# Patient Record
Sex: MALE | Race: BLACK OR AFRICAN AMERICAN | NOT HISPANIC OR LATINO | URBAN - METROPOLITAN AREA
[De-identification: names, ages, dates, MRNs, and addresses within clinical notes are randomized per-mention and may not be internally consistent; named-entity substitution may affect disease eponyms.]

---

## 2023-10-06 ENCOUNTER — TELEPHONE (OUTPATIENT)
Dept: PRIMARY CARE | Facility: CLINIC | Age: 28
End: 2023-10-06
Payer: COMMERCIAL

## 2023-10-06 NOTE — TELEPHONE ENCOUNTER
[facility] call ed requesting a referral for Dermatology to be sent and back dated for dates of service 3/28/2023, 5/02/2023, 05/18/2023, 05/17/2023. States fax has already been sent for this request. Please fax to [phone].

## 2023-10-16 ENCOUNTER — TELEPHONE (OUTPATIENT)
Dept: PRIMARY CARE | Facility: CLINIC | Age: 28
End: 2023-10-16
Payer: COMMERCIAL

## 2023-10-16 NOTE — TELEPHONE ENCOUNTER
Requesting referral for Dermatology dates of service 3/28/2023, 05/02/2023, 05/18/2023, 05/17/2023. Please send to [phone]

## 2023-10-23 ENCOUNTER — TELEPHONE (OUTPATIENT)
Dept: PRIMARY CARE | Facility: CLINIC | Age: 28
End: 2023-10-23
Payer: COMMERCIAL

## 2023-10-23 DIAGNOSIS — Z76.0 MEDICATION REFILL: ICD-10-CM

## 2023-10-23 RX ORDER — ATENOLOL 25 MG/1
25 TABLET ORAL DAILY
Qty: 90 TABLET | Refills: 1 | Status: SHIPPED | OUTPATIENT
Start: 2023-10-23 | End: 2024-04-29

## 2023-10-23 RX ORDER — ATENOLOL 25 MG/1
25 TABLET ORAL DAILY
COMMUNITY
End: 2023-10-23 | Stop reason: SDUPTHER

## 2023-10-23 NOTE — TELEPHONE ENCOUNTER
PT called in requesting refill on RX atenolol 25 MG. States that pharmacy told him it was rejected by prescriber. Please send to [facility] in [facility].

## 2023-10-25 ENCOUNTER — HOSPITAL ENCOUNTER (EMERGENCY)
Facility: HOSPITAL | Age: 28
Discharge: HOME | End: 2023-10-26
Attending: EMERGENCY MEDICINE
Payer: COMMERCIAL

## 2023-10-25 ENCOUNTER — APPOINTMENT (OUTPATIENT)
Dept: RADIOLOGY | Facility: HOSPITAL | Age: 28
End: 2023-10-25
Payer: COMMERCIAL

## 2023-10-25 VITALS
BODY MASS INDEX: 33.88 KG/M2 | TEMPERATURE: 98.6 F | RESPIRATION RATE: 13 BRPM | HEART RATE: 52 BPM | WEIGHT: 242 LBS | SYSTOLIC BLOOD PRESSURE: 142 MMHG | HEIGHT: 71 IN | OXYGEN SATURATION: 97 % | DIASTOLIC BLOOD PRESSURE: 101 MMHG

## 2023-10-25 DIAGNOSIS — R07.9 CHEST PAIN, UNSPECIFIED TYPE: Primary | ICD-10-CM

## 2023-10-25 LAB
ALBUMIN SERPL BCP-MCNC: 4.6 G/DL (ref 3.4–5)
ALP SERPL-CCNC: 53 U/L (ref 33–120)
ALT SERPL W P-5'-P-CCNC: 18 U/L (ref 10–52)
ANION GAP SERPL CALC-SCNC: 12 MMOL/L (ref 10–20)
AST SERPL W P-5'-P-CCNC: 16 U/L (ref 9–39)
BASOPHILS # BLD AUTO: 0.01 X10*3/UL (ref 0–0.1)
BASOPHILS NFR BLD AUTO: 0.1 %
BILIRUB SERPL-MCNC: 0.8 MG/DL (ref 0–1.2)
BUN SERPL-MCNC: 10 MG/DL (ref 6–23)
CALCIUM SERPL-MCNC: 9.5 MG/DL (ref 8.6–10.3)
CARDIAC TROPONIN I PNL SERPL HS: 5 NG/L (ref 0–20)
CHLORIDE SERPL-SCNC: 100 MMOL/L (ref 98–107)
CO2 SERPL-SCNC: 31 MMOL/L (ref 21–32)
CREAT SERPL-MCNC: 1 MG/DL (ref 0.5–1.3)
D DIMER PPP FEU-MCNC: ABNORMAL NG/ML FEU
EOSINOPHIL # BLD AUTO: 0.12 X10*3/UL (ref 0–0.7)
EOSINOPHIL NFR BLD AUTO: 1.5 %
ERYTHROCYTE [DISTWIDTH] IN BLOOD BY AUTOMATED COUNT: 14.9 % (ref 11.5–14.5)
FLUAV RNA RESP QL NAA+PROBE: NOT DETECTED
FLUBV RNA RESP QL NAA+PROBE: NOT DETECTED
GFR SERPL CREATININE-BSD FRML MDRD: >90 ML/MIN/1.73M*2
GLUCOSE SERPL-MCNC: 81 MG/DL (ref 74–99)
HCT VFR BLD AUTO: 44 % (ref 41–52)
HGB BLD-MCNC: 13.5 G/DL (ref 13.5–17.5)
IMM GRANULOCYTES # BLD AUTO: 0.01 X10*3/UL (ref 0–0.7)
IMM GRANULOCYTES NFR BLD AUTO: 0.1 % (ref 0–0.9)
LYMPHOCYTES # BLD AUTO: 2.53 X10*3/UL (ref 1.2–4.8)
LYMPHOCYTES NFR BLD AUTO: 32.3 %
MCH RBC QN AUTO: 22.5 PG (ref 26–34)
MCHC RBC AUTO-ENTMCNC: 30.7 G/DL (ref 32–36)
MCV RBC AUTO: 73 FL (ref 80–100)
MONOCYTES # BLD AUTO: 0.71 X10*3/UL (ref 0.1–1)
MONOCYTES NFR BLD AUTO: 9.1 %
NEUTROPHILS # BLD AUTO: 4.45 X10*3/UL (ref 1.2–7.7)
NEUTROPHILS NFR BLD AUTO: 56.9 %
NRBC BLD-RTO: 0 /100 WBCS (ref 0–0)
PLATELET # BLD AUTO: 283 X10*3/UL (ref 150–450)
PMV BLD AUTO: 12.3 FL (ref 7.5–11.5)
POTASSIUM SERPL-SCNC: 3.5 MMOL/L (ref 3.5–5.3)
PROT SERPL-MCNC: 8.8 G/DL (ref 6.4–8.2)
RBC # BLD AUTO: 6.01 X10*6/UL (ref 4.5–5.9)
SARS-COV-2 RNA RESP QL NAA+PROBE: NOT DETECTED
SODIUM SERPL-SCNC: 139 MMOL/L (ref 136–145)
WBC # BLD AUTO: 7.8 X10*3/UL (ref 4.4–11.3)

## 2023-10-25 PROCEDURE — 36415 COLL VENOUS BLD VENIPUNCTURE: CPT | Performed by: PHYSICIAN ASSISTANT

## 2023-10-25 PROCEDURE — 71046 X-RAY EXAM CHEST 2 VIEWS: CPT | Performed by: RADIOLOGY

## 2023-10-25 PROCEDURE — 80053 COMPREHEN METABOLIC PANEL: CPT | Performed by: EMERGENCY MEDICINE

## 2023-10-25 PROCEDURE — 2550000001 HC RX 255 CONTRASTS: Performed by: EMERGENCY MEDICINE

## 2023-10-25 PROCEDURE — 2500000005 HC RX 250 GENERAL PHARMACY W/O HCPCS: Performed by: EMERGENCY MEDICINE

## 2023-10-25 PROCEDURE — 2500000004 HC RX 250 GENERAL PHARMACY W/ HCPCS (ALT 636 FOR OP/ED): Performed by: PHYSICIAN ASSISTANT

## 2023-10-25 PROCEDURE — 87636 SARSCOV2 & INF A&B AMP PRB: CPT | Performed by: PHYSICIAN ASSISTANT

## 2023-10-25 PROCEDURE — 71275 CT ANGIOGRAPHY CHEST: CPT

## 2023-10-25 PROCEDURE — 71275 CT ANGIOGRAPHY CHEST: CPT | Performed by: RADIOLOGY

## 2023-10-25 PROCEDURE — 85379 FIBRIN DEGRADATION QUANT: CPT | Performed by: PHYSICIAN ASSISTANT

## 2023-10-25 PROCEDURE — 96374 THER/PROPH/DIAG INJ IV PUSH: CPT

## 2023-10-25 PROCEDURE — 84484 ASSAY OF TROPONIN QUANT: CPT | Performed by: EMERGENCY MEDICINE

## 2023-10-25 PROCEDURE — 99284 EMERGENCY DEPT VISIT MOD MDM: CPT | Performed by: EMERGENCY MEDICINE

## 2023-10-25 PROCEDURE — 71046 X-RAY EXAM CHEST 2 VIEWS: CPT | Mod: FY

## 2023-10-25 PROCEDURE — 85025 COMPLETE CBC W/AUTO DIFF WBC: CPT | Performed by: EMERGENCY MEDICINE

## 2023-10-25 PROCEDURE — 36415 COLL VENOUS BLD VENIPUNCTURE: CPT | Performed by: EMERGENCY MEDICINE

## 2023-10-25 RX ORDER — KETOROLAC TROMETHAMINE 30 MG/ML
15 INJECTION, SOLUTION INTRAMUSCULAR; INTRAVENOUS ONCE
Status: COMPLETED | OUTPATIENT
Start: 2023-10-25 | End: 2023-10-25

## 2023-10-25 RX ADMIN — IOHEXOL 72 ML: 350 INJECTION, SOLUTION INTRAVENOUS at 20:58

## 2023-10-25 RX ADMIN — KETOROLAC TROMETHAMINE 15 MG: 30 INJECTION, SOLUTION INTRAMUSCULAR; INTRAVENOUS at 20:32

## 2023-10-25 RX ADMIN — DIPHENHYDRAMINE HYDROCHLORIDE AND LIDOCAINE HYDROCHLORIDE AND ALUMINUM HYDROXIDE AND MAGNESIUM HYDRO 10 ML: KIT at 23:30

## 2023-10-25 ASSESSMENT — PAIN - FUNCTIONAL ASSESSMENT: PAIN_FUNCTIONAL_ASSESSMENT: 0-10

## 2023-10-25 ASSESSMENT — COLUMBIA-SUICIDE SEVERITY RATING SCALE - C-SSRS
6. HAVE YOU EVER DONE ANYTHING, STARTED TO DO ANYTHING, OR PREPARED TO DO ANYTHING TO END YOUR LIFE?: NO
1. IN THE PAST MONTH, HAVE YOU WISHED YOU WERE DEAD OR WISHED YOU COULD GO TO SLEEP AND NOT WAKE UP?: NO
2. HAVE YOU ACTUALLY HAD ANY THOUGHTS OF KILLING YOURSELF?: NO

## 2023-10-25 ASSESSMENT — PAIN DESCRIPTION - PAIN TYPE: TYPE: ACUTE PAIN

## 2023-10-25 ASSESSMENT — PAIN DESCRIPTION - LOCATION: LOCATION: CHEST

## 2023-10-25 ASSESSMENT — PAIN DESCRIPTION - DESCRIPTORS: DESCRIPTORS: ACHING

## 2023-10-25 ASSESSMENT — PAIN SCALES - GENERAL
PAINLEVEL_OUTOF10: 2
PAINLEVEL_OUTOF10: 4

## 2023-10-25 NOTE — ED TRIAGE NOTES
TRIAGE NOTE   I saw the patient as the Clinician in Triage and performed a brief history and physical exam, established acuity, and ordered appropriate tests to develop basic plan of care. Patient will be seen by an GREGORIO, resident and/or physician who will independently evaluate the patient. Please see subsequent provider notes for further details and disposition.     Brief HPI: In brief, Klaus Jackson is a 28 y.o. male that presents for Pain to L side of chest since Saturday. No URI symptoms. Pt has HTN and is on meds for it.  Patient states that when he takes a deep breath he does feel pain to the left-sided chest.    Focused Physical exam:   Patient has normal heart and lung sounds.  Plan/MDM:   As patient states that he has chest pain work-up was initiated by nursing.  He had an x-ray completed.  I ordered a VTE D-dimer.  Lab work is pending.  Troponin is pending.  Patient will be given anti-inflammatory meds and reassessed.     Please see subsequent provider note for further details and disposition

## 2023-10-26 ASSESSMENT — PAIN SCALES - GENERAL: PAINLEVEL_OUTOF10: 0 - NO PAIN

## 2023-10-26 ASSESSMENT — PAIN - FUNCTIONAL ASSESSMENT: PAIN_FUNCTIONAL_ASSESSMENT: 0-10

## 2023-10-26 NOTE — DISCHARGE INSTRUCTIONS
Please follow-up with your primary care doctor or one of the doctors we have provided to establish care.  Take medications as indicated.  Return immediately if concerning symptoms, as discussed.

## 2023-10-26 NOTE — ED PROVIDER NOTES
HPI   Chief Complaint   Patient presents with    Chest Pain       HPI  Patient is a 28-year-old male with a past medical history significant for hypertension who presents emergency room with a chief complaint of chest pain.  Patient states that since Saturday he has felt some discomfort to the right side of the chest not radiating to the left.  It was pleuritic and was worse this morning feeling like a pressure in the retrosternal region.  He denies any shortness of breath, leg pain or swelling, fever, chills, cough, nausea, vomiting or any other symptoms at this time.  He is currently declining pain medication.      PMHx: As above  PSHx: Denies  FamilyHx: Hypertension  SocialHx: Denies  Allergies: Denies  Medications: See Medication Reconciliation     ROS  As above but otherwise denies    Physical Exam    GENERAL: Awake and Alert, No Acute Distress  HEENT: AT/NC, PERRL, EOMI, Normal Oropharynx, No Signs of Dehydration  NECK: Normal Inspection, No JVD  CARDIOVASCULAR: RRR, No M/R/G  RESPIRATORY: CTA Bilaterally, No Wheezes, Rales or Rhonchi, Chest Wall Non-tender  ABDOMEN: Soft, non-tender abdomen, Normal Bowel Sounds, No Distention  BACK: No CVA Tenderness  SKIN: Normal Color, Warm, Dry, No Rashes   EXTREMITIES: Non-Tender, Full ROM, No Pedal Edema  NEURO: A&O x 3, Normal Motor and Sensation, Normal Mood and Affect    Nursing Assessment and Vitals Reviewed    EKG showed a normal sinus rhythm 64 bpm.  No significant interval prolongations or ischemic ST changes.  No T wave inversions or axis deviation.    Medical Decision  Patient is seen and evaluated for chest pain that FolicA pressure and was worse with inspiration since Saturday.  It is not retrosternal.  On arrival he is very well-appearing and in no acute distress.  Lungs are clear and heart is regular in rhythm.  He was initially hypertensive with improvement.  He is declining pain medication as his discomfort is not significant.  He has no leg pain or  swelling and otherwise appears well.  Work-up for patient included labs that showed a normal troponin and otherwise no leukocytosis or decrease in his hemoglobin.  Flu and COVID were negative.  His D-dimer was markedly elevated and he had a CT of the chest that showed no signs of acute pulmonary embolus to the segmental level.  He did have a small hiatal hernia.  He was given a BMX and will be discharged home with a prescription for PPI.  He remains well-appearing and in no acute distress.  He is instructed to follow-up with his primary care physician.  He is educated on supportive care at home as well as signs and symptoms that should prompt immediate turn to emergency room.                              No data recorded                Patient History   Past Medical History:   Diagnosis Date    Hypertension      History reviewed. No pertinent surgical history.  No family history on file.  Social History     Tobacco Use    Smoking status: Never    Smokeless tobacco: Never   Substance Use Topics    Alcohol use: Not on file    Drug use: Not on file       Physical Exam   ED Triage Vitals   Temp Heart Rate Resp BP   10/25/23 1527 10/25/23 1527 10/25/23 1527 10/25/23 1527   37 °C (98.6 °F) 66 18 (!) 150/106      SpO2 Temp Source Heart Rate Source Patient Position   10/25/23 1527 10/25/23 1527 10/25/23 2241 10/25/23 1527   97 % Temporal Monitor Sitting      BP Location FiO2 (%)     10/25/23 1527 --     Left arm        Physical Exam    ED Course & MDM        Medical Decision Making      Procedure  Procedures     Jess Lindsay MD  10/25/23 5829

## 2023-11-06 DIAGNOSIS — Z76.0 MEDICATION REFILL: ICD-10-CM

## 2023-11-06 RX ORDER — AMLODIPINE BESYLATE 10 MG/1
10 TABLET ORAL DAILY
Qty: 30 TABLET | Refills: 0 | Status: SHIPPED | OUTPATIENT
Start: 2023-11-06 | End: 2023-12-04

## 2023-11-08 ENCOUNTER — TELEPHONE (OUTPATIENT)
Dept: PRIMARY CARE | Facility: CLINIC | Age: 28
End: 2023-11-08
Payer: COMMERCIAL

## 2023-11-08 DIAGNOSIS — R00.2 PALPITATIONS: ICD-10-CM

## 2023-11-08 DIAGNOSIS — R00.1 BRADYCARDIA: Primary | ICD-10-CM

## 2023-11-22 NOTE — TELEPHONE ENCOUNTER
Called requesting referral be sent for PT, as PT has already been seen by cardiology, and they need a referral to be sent in order for insurance to cover the visit. Please send referral to [phone]

## 2023-11-22 NOTE — TELEPHONE ENCOUNTER
On 9/20/2023 the patient was referred by dr andrews for palpitations and bradycardia per ecw note. Needs a new referral placed through [facility]. Please advise

## 2023-11-22 NOTE — TELEPHONE ENCOUNTER
Cardiology referral entered for Dr.Amir David with [facility], this was the only recent cardiology provider I was able to see in [facility].  Please let me know if this is incorrect.

## 2023-11-29 ENCOUNTER — TELEPHONE (OUTPATIENT)
Dept: PRIMARY CARE | Facility: CLINIC | Age: 28
End: 2023-11-29
Payer: COMMERCIAL

## 2023-12-04 ENCOUNTER — TELEPHONE (OUTPATIENT)
Dept: PRIMARY CARE | Facility: CLINIC | Age: 28
End: 2023-12-04
Payer: COMMERCIAL

## 2023-12-04 DIAGNOSIS — Z76.0 MEDICATION REFILL: ICD-10-CM

## 2023-12-04 RX ORDER — AMLODIPINE BESYLATE 10 MG/1
10 TABLET ORAL DAILY
Qty: 90 TABLET | Refills: 1 | Status: SHIPPED | OUTPATIENT
Start: 2023-12-04 | End: 2024-05-21

## 2023-12-04 NOTE — TELEPHONE ENCOUNTER
----- Message -----   From: Mary Ann Pinzon MD   Sent: 12/4/2023   9:48 AM EST   To: Elroy Man     Since no referral in chart please set up at least a TELE visit for me to review this referral with my patient and I will be glad to enter a REFERRAL.   ----- Message -----   From: Elroy Man   Sent: 11/30/2023   2:01 PM EST   To: Mary Ann Pinzon MD     No referral in [facility]W needs placed in Epic   ----- Message -----   From: Mary Ann Pinzon MD   Sent: 11/28/2023   3:01 PM EST   To: Elroy Man     Staff (Elroy) please research whether or not we have a referral for this patient on file in eCW or EPIC and if not, if it is possible to retro refer.  Please then notify pt in case we need a FU visit to get him properly referred.    ----- Message -----   From: Elroy Man   Sent: 11/28/2023   8:50 AM EST   To: Mary Ann Pinzon MD

## 2023-12-04 NOTE — TELEPHONE ENCOUNTER
We received a new referral request for the patient from the [facility]. Per dr andrews request- will need tele visit appt to discuss and place

## 2024-02-06 ENCOUNTER — TELEPHONE (OUTPATIENT)
Dept: PRIMARY CARE | Facility: CLINIC | Age: 29
End: 2024-02-06
Payer: COMMERCIAL

## 2024-02-06 NOTE — TELEPHONE ENCOUNTER
Routed document to PCP, however I am unaware how to assist with this further, can you please help?

## 2024-02-06 NOTE — TELEPHONE ENCOUNTER
[facility] called requesting an update on the referral request - it is scanned into media from 11/2023. They are requesting to speak with someone about this referral as it has still not been done yet. Call back at [phone]

## 2024-03-07 ENCOUNTER — TELEPHONE (OUTPATIENT)
Dept: PRIMARY CARE | Facility: CLINIC | Age: 29
End: 2024-03-07
Payer: COMMERCIAL

## 2024-03-14 NOTE — TELEPHONE ENCOUNTER
We did not refer patient to dermatology - need to find out reason why is he seeing derm and see if PCP will place referral

## 2024-04-28 DIAGNOSIS — Z76.0 MEDICATION REFILL: ICD-10-CM

## 2024-04-29 RX ORDER — ATENOLOL 25 MG/1
25 TABLET ORAL DAILY
Qty: 90 TABLET | Refills: 1 | Status: SHIPPED | OUTPATIENT
Start: 2024-04-29

## 2024-05-21 DIAGNOSIS — Z76.0 MEDICATION REFILL: ICD-10-CM

## 2024-05-21 RX ORDER — AMLODIPINE BESYLATE 10 MG/1
10 TABLET ORAL DAILY
Qty: 90 TABLET | Refills: 1 | Status: SHIPPED | OUTPATIENT
Start: 2024-05-21

## 2024-07-09 NOTE — PROGRESS NOTES
This is a 29 year old male for ROUTINE MEDICAL and ARRIVES with Hx ATYPICAL CHEST PAIN left chest and LEFT ARM PAIN one week ago lasting 1/2 hour and BOTH EXERTIONAL and at rest at work and at home.    WORKS IN WAREHOUSE    DENIES DRUG or substance use or abuse  NO ETOH abuse      ALSO HAS FORM FROM [employer] re MED STABILIZATION LETTER and this is deferred to another VISIT so we can focus on CLEARING his HEART and he is asked to DEFER THIS until CARDIAC CLEARANCE in any case from CARDIOLOGY which they will definitely see takes priority.        ROS is NEG for HEADACHE, NAUSEA, VOMITING, DIARRHEA, CHEST PAIN, SOB, and BLEEDING and as further REVIEWED BELOW.    Subjective   Klaus Jackson is a 29 y.o. male who presents for Annual Exam.    HPI:    Per nursing intake, pt here for Annual Exam       Review of systems is essentially negative for all systems except for any identified issues in HPI above.    Objective     /84   Pulse 64   Temp 37 °C (98.6 °F)   Wt 113 kg (249 lb 12.8 oz)   SpO2 98%   BMI 34.84 kg/m²      Physical Examination:       GENERAL           General Appearance: pleasant, well-appearing, well-developed, well-hydrated, well-nourished, well-groomed.        HEENT           NECK supple, Neg for adneopathy no thyroid enlargement or nodules, Oropharynx normal no exudates.        EYES           Pupils: PERRLA, no photophobia.        HEART           Rate and Rhythm regular rate and rhythm. Heart sounds: normal S1S2. Murmurs: none.        LUNGS           Effort: Normal chest wall, no pectus, Normal air entry all fields, Clear to IPPA, RR<16 with no use of accessory muscles.        BACK           General: unremarkable, no spinal tenderness or rashes.        ABDOMEN           General: Normal to inspection, neg for LKKS or masses and BSs heard in all quadrants               LYMPHATICS           Cervical: none. Axillary: none.        MUSCULOSKELETAL           gross abnormalities no gross  abnormalities, no joint redness or swelling.        EXTREMITIES           Varicose veins: not present. Pulses: 2+ bilateral. Clubbing: none. Cyanosis: no.        NEUROLOGICAL           Orientation: alert and oriented x 3. Grossly normal: yes. Plantars: downgoing bilaterally. Muscle Bulk: normal . Cranial Nerves: CN's II-XII grossly intact.        PSYCHOLOGY           Affect: appropriate. Mood: pleasant.       Assessment/Plan   DUE TO PRESENTATION for CPE today but has Hx recent CHEST PAIN and LEFT ARM PAIN he is sent to CARDIOLOGIST for full work up especially due to ENLISTING IN [place] and will need full clearance.    Problem List Items Addressed This Visit    None  Visit Diagnoses       Routine medical exam    -  Primary    Relevant Orders    CBC    Comprehensive metabolic panel    Microscopic Only, Urine    Lipid panel    HIV 1/2 Antigen/Antibody Screen with Reflex to Confirmation    Hepatitis C antibody    Health counseling        Immunization counseling        Screening due        Hypertension, unspecified type        Atypical chest pain        Relevant Orders    XR chest 2 views    ECG 12 lead (Clinic Performed)    Referral to Cardiology    Left arm pain        Relevant Orders    XR chest 2 views    ECG 12 lead (Clinic Performed)    Referral to Cardiology            FOLLOW UP:   YEARLY for ROUTINE MEDICAL and PRN for other issues as discussed in visit         Mary Ann Maynard M.D.

## 2024-07-17 ENCOUNTER — OFFICE VISIT (OUTPATIENT)
Dept: PRIMARY CARE | Facility: CLINIC | Age: 29
End: 2024-07-17
Payer: COMMERCIAL

## 2024-07-17 VITALS
HEART RATE: 64 BPM | TEMPERATURE: 98.6 F | OXYGEN SATURATION: 98 % | WEIGHT: 249.8 LBS | DIASTOLIC BLOOD PRESSURE: 84 MMHG | BODY MASS INDEX: 34.84 KG/M2 | SYSTOLIC BLOOD PRESSURE: 132 MMHG

## 2024-07-17 DIAGNOSIS — Z71.9 HEALTH COUNSELING: ICD-10-CM

## 2024-07-17 DIAGNOSIS — R07.89 ATYPICAL CHEST PAIN: ICD-10-CM

## 2024-07-17 DIAGNOSIS — Z13.6 SCREENING FOR HEART DISEASE: ICD-10-CM

## 2024-07-17 DIAGNOSIS — Z71.85 IMMUNIZATION COUNSELING: ICD-10-CM

## 2024-07-17 DIAGNOSIS — Z13.9 SCREENING DUE: ICD-10-CM

## 2024-07-17 DIAGNOSIS — M79.602 LEFT ARM PAIN: ICD-10-CM

## 2024-07-17 DIAGNOSIS — Z00.00 ROUTINE MEDICAL EXAM: Primary | ICD-10-CM

## 2024-07-17 DIAGNOSIS — I10 HYPERTENSION, UNSPECIFIED TYPE: ICD-10-CM

## 2024-07-17 PROCEDURE — 3079F DIAST BP 80-89 MM HG: CPT | Performed by: FAMILY MEDICINE

## 2024-07-17 PROCEDURE — 93000 ELECTROCARDIOGRAM COMPLETE: CPT | Performed by: FAMILY MEDICINE

## 2024-07-17 PROCEDURE — 93005 ELECTROCARDIOGRAM TRACING: CPT | Performed by: FAMILY MEDICINE

## 2024-07-17 PROCEDURE — 1036F TOBACCO NON-USER: CPT | Performed by: FAMILY MEDICINE

## 2024-07-17 PROCEDURE — 3075F SYST BP GE 130 - 139MM HG: CPT | Performed by: FAMILY MEDICINE

## 2024-07-17 PROCEDURE — 99395 PREV VISIT EST AGE 18-39: CPT | Performed by: FAMILY MEDICINE

## 2024-07-17 RX ORDER — SPIRONOLACTONE 25 MG/1
12.5 TABLET ORAL
COMMUNITY
Start: 2024-04-30 | End: 2024-07-29

## 2024-07-17 ASSESSMENT — COLUMBIA-SUICIDE SEVERITY RATING SCALE - C-SSRS
1. IN THE PAST MONTH, HAVE YOU WISHED YOU WERE DEAD OR WISHED YOU COULD GO TO SLEEP AND NOT WAKE UP?: NO
6. HAVE YOU EVER DONE ANYTHING, STARTED TO DO ANYTHING, OR PREPARED TO DO ANYTHING TO END YOUR LIFE?: NO
2. HAVE YOU ACTUALLY HAD ANY THOUGHTS OF KILLING YOURSELF?: NO

## 2024-07-17 ASSESSMENT — PATIENT HEALTH QUESTIONNAIRE - PHQ9
1. LITTLE INTEREST OR PLEASURE IN DOING THINGS: NOT AT ALL
2. FEELING DOWN, DEPRESSED OR HOPELESS: NOT AT ALL
SUM OF ALL RESPONSES TO PHQ9 QUESTIONS 1 AND 2: 0

## 2024-07-17 ASSESSMENT — PAIN SCALES - GENERAL: PAINLEVEL: 0-NO PAIN

## 2024-07-17 ASSESSMENT — ENCOUNTER SYMPTOMS
DEPRESSION: 0
LOSS OF SENSATION IN FEET: 0
OCCASIONAL FEELINGS OF UNSTEADINESS: 0

## 2024-07-24 ENCOUNTER — HOSPITAL ENCOUNTER (OUTPATIENT)
Dept: RADIOLOGY | Facility: HOSPITAL | Age: 29
Discharge: HOME | End: 2024-07-24
Payer: COMMERCIAL

## 2024-07-24 DIAGNOSIS — Z13.6 SCREENING FOR HEART DISEASE: ICD-10-CM

## 2024-07-24 PROCEDURE — 75571 CT HRT W/O DYE W/CA TEST: CPT

## 2024-07-31 ENCOUNTER — APPOINTMENT (OUTPATIENT)
Dept: CARDIOLOGY | Facility: CLINIC | Age: 29
End: 2024-07-31
Payer: COMMERCIAL

## 2024-07-31 NOTE — PROGRESS NOTES
"This is a 29 year old male for FU and PPWK  from [facility] REVIEW and COMPLETION related to medical conditions HTN BRADYCARDIA and LEFT ARM PAIN and was seen for a HOSPITAL ER FU visit recently with ATYPICAL CHEST PAIN and LEFT ARM PAIN  and referred to CARDIOLOGIST and MUST BE SEEN for final review of now treated HTN and ATYPICAL CHEST PAIN.    ALSO TO BE SEEN by an [employer] physician due to request letter of opinion regarding deployment while taking MEDS and FU and my personal recommendation is BP needs re check at least every six months if not more frequently    I have already asked HIM TO SEE CARDIOLOGY to further clear his heart for  DEPLOYMENT but also referring to [employer] physician for the detailed clearance [employer] is requesting.    HIS EPISODE OF CHEST PAIN was AFTER the \"SRP\" exam by an [facility] physician so I am recommending he seek re evaluation by [employer] PHYSICIAN at this time.    PRIORITY on our visit was clearing his heart.   Currently stable but NEEDS SPECIFIC CARDIAC CLEARANCE for DEPLOYMENT from CARDIOLOGIST.        PT HAS SUBMITTED an extensive request from [facility] [employer] LPN and we have briefly reviewed  but will DEFER TO patient seeking a FULL MEDICAL EVALUATION by [employer] physician to opine on pt readiness for deployment especially due to episode of CP and need to be without medical FU for a year per that letter kwaku due to there is RISK of HTN complications if not on meds during deployment for a period of time.    PT IS ON HTN MEDS amlodipine and atenolol and spironolactone  see med list     I have been asked to opine on his MED STABILIZATION for HTN and has BP REMAINS SLIGHTLY ELEVATED TODAY and is referred at past visit to CARDIOLOGY who will manage this further.    PHQ2 testing is negative and SPECIFIC SUICIDAL or HOMICIDAL ideation is RULED OUT and states none now or ever.      ROS is NEG for HEADACHE, NAUSEA, VOMITING, DIARRHEA, CHEST PAIN, SOB, and BLEEDING and as " "further REVIEWED BELOW.      Subjective   Klaus Jackson is a 29 y.o. male who presents for FU and [employer] PW.    HPI:    Per nursing intake, pt here for FU and [employer] Dep[employer] PW       Review of systems is essentially negative for all systems except for any identified issues in HPI above.    Objective     /82   Pulse 73   Temp 36.8 °C (98.3 °F)   Ht 1.803 m (5' 11\")   Wt 112 kg (247 lb)   SpO2 99%   BMI 34.45 kg/m²      Physical Examination:       GENERAL           General Appearance: well-appearing, well-developed, well-hydrated, well-nourished, no acute distress.        HEENT           NECK supple, no masses or thyromegaly, no carotid bruit.        EYES           Extraocular Movements: normal, bilateral eyes AIYANA, no conjunctival injection.        HEART           Rate and Rhythm regular rate and rhythm. Heart sounds: normal S1S2, no S3 or S4. Murmurs: none.        CHEST           Breath sounds: Clear to IPPA, RR<16 no use of accessory muscles.        ABDOMEN           General: Neg for LKKS or masses, no scleral icterus or jaundice.        MUSCULOSKELETAL           Joints Demonstration: Neg for erythema, swelling or joint deformities. gross abnormalities no gross abnormalities.        EXTREMITIES           Lower Extremities: Neg for cyanosis, clubbing or edema.       Assessment/Plan   Pt arrived with first BP diastolic at 90 and repeat was WNL        August 2nd, 2024  NOTE TO [employer]:      This is a 29 year old male for FU and PPWK  from [employer] REVIEW and COMPLETION related to medical conditions HTN BRADYCARDIA and LEFT ARM PAIN and was seen for a HOSPITAL ER FU visit recently with ATYPICAL CHEST PAIN and LEFT ARM PAIN  and referred to CARDIOLOGIST and MUST BE SEEN for final review of now treated HTN and ATYPICAL CHEST PAIN.      ALSO TO BE SEEN by an [employer] physician due to request letter of opinion regarding deployment while taking MEDS and FU and my personal " "recommendation is BP needs re check at least every six months if not more frequently.      I have already asked HIM TO SEE CARDIOLOGY to further clear his heart for  DEPLOYMENT but also referring to [employer] physician for the detailed clearance [employer] is requesting.    HIS EPISODE OF CHEST PAIN was AFTER the initial  \"SRP\" exam by an [facility] physician so I am recommending he seek re evaluation by [employer] PHYSICIAN at this time for re evaluation and clearance.      PRIORITY on our visit was clearing his heart.   Currently stable but NEEDS SPECIFIC CARDIAC CLEARANCE for DEPLOYMENT from CARDIOLOGIST especially since on arrival today his diastolic BP was 90 but was rechecked and normalized.        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   Due to there is some RISK of complications if HTN goes untreated; and his recent episode of CP seen at an ER;, he DOES need full Cardiology evaluation and opinion and CLEARANCE.    PT IS ON HTN MEDS amlodipine and atenolol and spironolactone  see med list  and other records you may obtain from our records department.    I have been asked to opine on his MED STABILIZATION for HTN and has BP in now normalized during visit but was SLIGHTLY ELEVATED TODAY on arrival to my office; and he is urged to make and keep a CARDIOLOGY consultation and this provider will further evaluate the CP and ARM PAIN for deployment risk and provide clearance.    PHQ2 testing is negative and SPECIFIC SUICIDAL or HOMICIDAL ideation is RULED OUT and states none now or ever.      I wish him well and have advised " him that he will most likely pass these reviews but it is my job to keep him safe from medical harm so I require specialty evaluation by a CARDIOLOGIST as well as statement from [employer] physician regarding his fitness for the risks of being without care for a prolonged period of time as well as the inherent risks of deployment.  Please consider having the [facility] NP who sent the letter for clearanace to me, instead refer him to a specific [facility] physician for final review and clearance after he is cleared by Cardiologist.        Sincerely,          Mary Ann Maynard MD        Problem List Items Addressed This Visit    None  Visit Diagnoses       Hypertension, unspecified type    -  Primary    Left arm pain        Atypical chest pain        Bradycardia        Medication refill                FOLLOW UP:  PRN and as specified above         Mary Ann Maynard M.D.

## 2024-08-02 ENCOUNTER — OFFICE VISIT (OUTPATIENT)
Dept: PRIMARY CARE | Facility: CLINIC | Age: 29
End: 2024-08-02
Payer: COMMERCIAL

## 2024-08-02 VITALS
OXYGEN SATURATION: 99 % | TEMPERATURE: 98.3 F | BODY MASS INDEX: 34.58 KG/M2 | HEIGHT: 71 IN | SYSTOLIC BLOOD PRESSURE: 130 MMHG | HEART RATE: 73 BPM | DIASTOLIC BLOOD PRESSURE: 82 MMHG | WEIGHT: 247 LBS

## 2024-08-02 DIAGNOSIS — I10 HYPERTENSION, UNSPECIFIED TYPE: Primary | ICD-10-CM

## 2024-08-02 DIAGNOSIS — R00.1 BRADYCARDIA: ICD-10-CM

## 2024-08-02 DIAGNOSIS — R07.89 ATYPICAL CHEST PAIN: ICD-10-CM

## 2024-08-02 DIAGNOSIS — M79.602 LEFT ARM PAIN: ICD-10-CM

## 2024-08-02 DIAGNOSIS — Z76.0 MEDICATION REFILL: ICD-10-CM

## 2024-08-02 PROBLEM — F43.9 STRESS: Status: ACTIVE | Noted: 2024-08-02

## 2024-08-02 PROBLEM — H93.13 TINNITUS OF BOTH EARS: Status: ACTIVE | Noted: 2024-08-02

## 2024-08-02 PROBLEM — R07.9 CHEST PAIN: Status: ACTIVE | Noted: 2022-12-21

## 2024-08-02 PROBLEM — E27.9 ADRENAL NODULE: Status: ACTIVE | Noted: 2023-12-21

## 2024-08-02 PROBLEM — N20.0 CALCULUS OF KIDNEY: Status: ACTIVE | Noted: 2024-08-02

## 2024-08-02 PROBLEM — E87.6 HYPOKALEMIA: Status: ACTIVE | Noted: 2023-03-14

## 2024-08-02 PROBLEM — E27.8 ADRENAL NODULE (MULTI): Status: ACTIVE | Noted: 2023-12-21

## 2024-08-02 PROCEDURE — 99214 OFFICE O/P EST MOD 30 MIN: CPT | Performed by: FAMILY MEDICINE

## 2024-08-02 PROCEDURE — 3075F SYST BP GE 130 - 139MM HG: CPT | Performed by: FAMILY MEDICINE

## 2024-08-02 PROCEDURE — 3079F DIAST BP 80-89 MM HG: CPT | Performed by: FAMILY MEDICINE

## 2024-08-02 PROCEDURE — 1036F TOBACCO NON-USER: CPT | Performed by: FAMILY MEDICINE

## 2024-08-02 PROCEDURE — 3008F BODY MASS INDEX DOCD: CPT | Performed by: FAMILY MEDICINE

## 2024-08-02 RX ORDER — CHLORHEXIDINE GLUCONATE ORAL RINSE 1.2 MG/ML
15 SOLUTION DENTAL AS NEEDED
COMMUNITY
Start: 2024-04-05

## 2024-08-02 RX ORDER — POTASSIUM CHLORIDE 20 MEQ/1
20 TABLET, EXTENDED RELEASE ORAL 2 TIMES DAILY
COMMUNITY
Start: 2023-05-10 | End: 2024-08-03 | Stop reason: ALTCHOICE

## 2024-08-02 ASSESSMENT — ENCOUNTER SYMPTOMS
OCCASIONAL FEELINGS OF UNSTEADINESS: 0
LOSS OF SENSATION IN FEET: 0
DEPRESSION: 0

## 2024-08-02 ASSESSMENT — PATIENT HEALTH QUESTIONNAIRE - PHQ9
2. FEELING DOWN, DEPRESSED OR HOPELESS: NOT AT ALL
1. LITTLE INTEREST OR PLEASURE IN DOING THINGS: NOT AT ALL
SUM OF ALL RESPONSES TO PHQ9 QUESTIONS 1 AND 2: 0

## 2024-08-02 ASSESSMENT — PAIN SCALES - GENERAL: PAINLEVEL: 0-NO PAIN

## 2024-08-02 NOTE — LETTER
"August 2, 2024     Patient: Klaus Jackson   YOB: 1995   Date of Visit: 8/2/2024         Thank you for referring Klaus Jackson to me for evaluation. Below are the relevant portions of my assessment and plan of care.    Assessment/Plan  Pt arrived with first BP diastolic at 90 and repeat was WNL        August 2nd, 2024  NOTE TO [place] [employer] [facility]:        This is a 29 year old male for FU and PPWK  from [facility] REVIEW and COMPLETION related to medical conditions HTN BRADYCARDIA and LEFT ARM PAIN and was seen for a HOSPITAL ER FU visit recently with ATYPICAL CHEST PAIN and LEFT ARM PAIN  and referred to CARDIOLOGIST and MUST BE SEEN for final review of now treated HTN and ATYPICAL CHEST PAIN.       ALSO TO BE SEEN by an [employer] physician due to request letter of opinion regarding deployment while taking MEDS and FU and my personal recommendation is BP needs re check at least every six months if not more frequently.       I have already asked HIM TO SEE CARDIOLOGY to further clear his heart for  DEPLOYMENT but also referring to [employer] physician for the detailed clearance [facility] is requesting.    HIS EPISODE OF CHEST PAIN was AFTER the initial  \"SRP\" exam by an [employer] physician so I am recommending he seek re evaluation by [employer] PHYSICIAN at this time for re evaluation and clearance.       PRIORITY on our visit was clearing his heart.   Currently stable but NEEDS SPECIFIC CARDIAC CLEARANCE for DEPLOYMENT from CARDIOLOGIST especially since on arrival today his diastolic BP was 90 but was rechecked and normalized.          This very pleasant and reliable patient HAS SUBMITTED an extensive request from [facility] [employer] Guard LPN for me to opine on his medical readiness for a long deployment;  and we have briefly reviewed  this letter but will DEFER TO patient seeking a FULL MEDICAL EVALUATION by an [employer] physician to opine on pt readiness for deployment especially due to episode of " CP and need to be without medical FU for a potentially long period of time.   Due to there is some RISK of complications if HTN goes untreated; and his recent episode of CP seen at an ER;, he DOES need full Cardiology evaluation and opinion and CLEARANCE.     PT IS ON HTN MEDS amlodipine and atenolol and spironolactone  see med list  and other records you may obtain from our records department.     I have been asked to opine on his MED STABILIZATION for HTN and has BP in now normalized during visit but was SLIGHTLY ELEVATED TODAY on arrival to my office; and he is urged to make and keep a CARDIOLOGY consultation and this provider will further evaluate the CP and ARM PAIN for deployment risk and provide clearance.     PHQ2 testing is negative and SPECIFIC SUICIDAL or HOMICIDAL ideation is RULED OUT and states none now or ever.       I wish him well and have advised him that he will most likely pass these reviews but it is my job to keep him safe from medical harm so I require specialty evaluation by a CARDIOLOGIST as well as statement from [employer] physician regarding his fitness for the risks of being without care for a prolonged period of time as well as the inherent risks of deployment.  Please consider having the [employer] NP who sent the letter for clearanace to me, instead refer him to a specific [employer] physician for final review and clearance after he is cleared by Cardiologist.          Sincerely,             Mary Ann Maynard MD     If you have questions, please do not hesitate to call me. I look forward to following Klaus along with you.

## 2024-08-02 NOTE — Clinical Note
August 2, 2024     Patient: Klaus Jackson   YOB: 1995   Date of Visit: 8/2/2024       To Whom It May Concern:    Klaus Jackson was seen in my clinic on 8/2/2024 at 8:00 am. Please excuse Klaus for his absence from work on this day to make the appointment.    If you have any questions or concerns, please don't hesitate to call.         Sincerely,         Mary Ann Pinzon MD        CC: No Recipients

## 2024-08-03 ENCOUNTER — OFFICE VISIT (OUTPATIENT)
Dept: CARDIOLOGY | Facility: CLINIC | Age: 29
End: 2024-08-03
Payer: COMMERCIAL

## 2024-08-03 VITALS
BODY MASS INDEX: 34.54 KG/M2 | HEART RATE: 72 BPM | HEIGHT: 71 IN | OXYGEN SATURATION: 95 % | WEIGHT: 246.7 LBS | SYSTOLIC BLOOD PRESSURE: 134 MMHG | DIASTOLIC BLOOD PRESSURE: 83 MMHG

## 2024-08-03 DIAGNOSIS — I1A.0 RESISTANT HYPERTENSION: Primary | ICD-10-CM

## 2024-08-03 DIAGNOSIS — R07.89 ATYPICAL CHEST PAIN: ICD-10-CM

## 2024-08-03 DIAGNOSIS — E87.6 HYPOKALEMIA: ICD-10-CM

## 2024-08-03 DIAGNOSIS — M79.602 LEFT ARM PAIN: ICD-10-CM

## 2024-08-03 DIAGNOSIS — E27.8 ADRENAL NODULE (MULTI): ICD-10-CM

## 2024-08-03 PROCEDURE — 3075F SYST BP GE 130 - 139MM HG: CPT | Performed by: INTERNAL MEDICINE

## 2024-08-03 PROCEDURE — 1036F TOBACCO NON-USER: CPT | Performed by: INTERNAL MEDICINE

## 2024-08-03 PROCEDURE — 99204 OFFICE O/P NEW MOD 45 MIN: CPT | Performed by: INTERNAL MEDICINE

## 2024-08-03 PROCEDURE — 3008F BODY MASS INDEX DOCD: CPT | Performed by: INTERNAL MEDICINE

## 2024-08-03 PROCEDURE — 99214 OFFICE O/P EST MOD 30 MIN: CPT | Performed by: INTERNAL MEDICINE

## 2024-08-03 PROCEDURE — 3079F DIAST BP 80-89 MM HG: CPT | Performed by: INTERNAL MEDICINE

## 2024-08-03 ASSESSMENT — PAIN SCALES - GENERAL: PAINLEVEL: 0-NO PAIN

## 2024-08-03 ASSESSMENT — ENCOUNTER SYMPTOMS
LOSS OF SENSATION IN FEET: 0
DEPRESSION: 0
OCCASIONAL FEELINGS OF UNSTEADINESS: 0

## 2024-08-03 NOTE — PATIENT INSTRUCTIONS
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

## 2024-08-03 NOTE — PROGRESS NOTES
Referred by Dr. Pinzon for New Patient Visit     History Of Present Illness:    Klaus Jackson is a 29 y.o. male presenting as new patient.     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 However he was scheduled for CT calcium score and CXR was referred for cardiology evaluation prior to deployment to [facility] at the end of this month    Briefly he does have history of hypertension has been on medications for a few years however he developed hypokalemia while on chlorthalidone leading to a workup which revealed hyperaldosteronism and an adrenal adenoma, however renal vein sampling was bilaterally equal thus felt to not be candidate for surgical treatment he was then started on spironolactone 25 mg he remains on atenolol and amlodipine as well    Currently blood pressure has been borderline controlled although he doesn't really check at home.     Of note he is actually already established with cardiology at the [facility] he was referred for resistant hypertension as well as intermittent chest pain in the setting of low potassium.  He underwent an exercise echocardiogram he had also had a transthoracic echo in 2014 both of which were unremarkable and was due to follow-up  with cardiology in the fall although he will miss that visit due to being deployed    He was working at [employer] although stopped with impending leave. He does work out by doing treadmill on the incline, then some kind of     His mother had high blood pressure, not aware of strokes/MI/CAD/HF    He doesn't smokes, rare ETOH, no stimulants.       Social History:  He reports that he has never smoked. He has never been exposed to tobacco smoke. He has never used smokeless tobacco. He reports current alcohol use. He  "reports that he does not use drugs.    Family History:  Family History   Problem Relation Name Age of Onset    Hypertension Mother          Allergies:  Patient has no known allergies.    Outpatient Medications:  Current Outpatient Medications   Medication Instructions    amLODIPine (NORVASC) 10 mg, oral, Daily    atenolol (TENORMIN) 25 mg, oral, Daily    chlorhexidine (Peridex) 0.12 % solution 15 mL, Mouth/Throat, As needed    spironolactone (ALDACTONE) 12.5 mg, oral, Daily RT        Last Recorded Vitals:  Vitals:    08/03/24 1039 08/03/24 1043   BP: 131/86 134/83   BP Location: Left arm Right arm   Patient Position: Sitting Sitting   BP Cuff Size: Large adult Large adult   Pulse: 72    SpO2: 95%    Weight: 112 kg (246 lb 11.2 oz)    Height: 1.803 m (5' 11\")        Physical Exam:  NAD, cvp not elevated, heart is regular, no murmurs, lungs clear, no edema, good pulse volume       Last Labs:  CBC -  Lab Results   Component Value Date    WBC 7.8 10/25/2023    HGB 13.5 10/25/2023    HCT 44.0 10/25/2023    MCV 73 (L) 10/25/2023     10/25/2023       CMP -  Lab Results   Component Value Date    CALCIUM 9.5 10/25/2023    PROT 8.8 (H) 10/25/2023    ALBUMIN 4.6 10/25/2023    AST 16 10/25/2023    ALT 18 10/25/2023    ALKPHOS 53 10/25/2023    BILITOT 0.8 10/25/2023       LIPID PANEL -   No results found for: \"CHOL\", \"TRIG\", \"HDL\", \"CHHDL\", \"LDLF\", \"VLDL\", \"NHDL\"    RENAL FUNCTION PANEL -   Lab Results   Component Value Date    GLUCOSE 81 10/25/2023     10/25/2023    K 3.5 10/25/2023     10/25/2023    CO2 31 10/25/2023    ANIONGAP 12 10/25/2023    BUN 10 10/25/2023    CREATININE 1.00 10/25/2023    GFRMALE >90 12/16/2022    CALCIUM 9.5 10/25/2023    ALBUMIN 4.6 10/25/2023        No results found for: \"BNP\", \"HGBA1C\"    Last Cardiology Tests:  ECG:  ECG 12 lead (Clinic Performed) 07/17/2024      Echo: 2023 Stress Echo  - Exam indication: HTN; Chest Pain  - The exercise stress echo was negative for ischemia at 79 " % of MPHR (10.1 METS).  - The left ventricle is normal in size. There is moderate concentric left  ventricular hypertrophy. Left ventricular systolic function is normal. EF = 64 ±  5% (2D biplane) Normal left ventricular diastolic function.  - The right ventricle is normal in size. Right ventricular systolic function is  normal.  - There are no significant valvular abnormalities.  - The patient has not had a prior CC echocardiographic exam for comparison.     TTE 2014  Normal LV size and systolic function.   No LVH.   No coarctation.   Limited SC images due to size.     Cardiac Imaging:  CT cardiac scoring wo IV contrast 07/24/2024   1. Coronary artery calcium score of 8.76*     Assessment/Plan     Resistant hypertension/hyperaldosteronism he has had extensive workup showing hyperaldosteronism with medical treatment recommended after renal vein sampling    His blood pressure is borderline controlled unclear if there is a whitecoat component we could consider 24-hour ambulatory monitoring but given that this will be a long-term problem I recommended he obtain a home blood pressure cuff to ensure that home blood pressures are less than 130.  Given his most recent serum potassium levels the spironolactone could be increased to 50 mg which might allow discontinuation of the atenolol if the main  of his hypertension is excess aldosterone    That being said I do not think this represents a contraindication to the appointment as long as his blood pressure remains in this range    Coronary calcium score although his overall coronary calcium score is low it is elevated for his age particularly given his concurrent risk factor of hypertension I would recommend a more aggressive strategy of lipid control targeting LDL less than 70 unless there is a contraindication which can probably be accomplished with a low-dose statin however that does not need to be initiated before his deployment    Chest pain no recent episodes,  reassuring workup     45 minutes spent reviewing chart imaging outside records    Vidal Thompson MD

## 2024-08-05 NOTE — PROGRESS NOTES
"This is a 29 year old male for  PAPERWORK completion and again, the [employer] is requesting a letter for which I can only partially comply:      As per our last visit, I am strongly recommending this very pleasant patient be evaluated by an [employer] PHYSICIAN but can confirm the following:        2024       To whom it may concern:  RE:  Klaus Grewal  1995    MED STABILIZATION LETTER provisionally shows he is stable today but has ONLY RECENTLY been on meds (now a combination of three BP meds including AMLODIPINE 10 mg daily, ATENOLOL 25 mg DAILY and SPIRONOLACTONE  12.5 mg daily) with  recent dosage titration so typically would need ongoing follow up at least every 6 months to be sure he tolerates the meds and remains stable and also lab follow up visits for use of the diuretic over time.  He SEEMS COMPLIANT with Rx plan and states that he is;  including taking meds and BP is normalized today.  Was sent to CARDIOLOGY for evaluation and he did attend so you may seek a note from his treating Cardiologist to answer the questions which I have answered to the best of my ability as a Primary Care physician as above..    Review of current note from his Cardiologist, Dr Vidal Thompson does document his diagnosis as \"RESISTANT HYPERTENSION\"  and history of ADRENAL ADENOMA but \"not a candidate for surgical treatment.\"   Per this evaluation by Dr Thompson, his BP is currently 'borderline controlled\" and he had low potassium.      For these reasons, specific Cardiac and HTN clearance per [employer] protocol requirements should come from the specialist, Dr. Thompson.    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          Sincerely,        Mary Ann Maynard M.D.      ROS is NEG for HEADACHE, NAUSEA, VOMITING, DIARRHEA, CHEST PAIN, SOB, and BLEEDING and as further REVIEWED BELOW.      Subjective "   Klaus Jackson is a 29 y.o. male who presents for  PW.    HPI:    Per nursing intake, pt here for  PW       Review of systems is essentially negative for all systems except for any identified issues in HPI above.    Objective     /82   Pulse 73   Temp 36.9 °C (98.5 °F)   Wt 112 kg (246 lb)   SpO2 99%   BMI 34.31 kg/m²      Physical Examination:       GENERAL           General Appearance: well-appearing, well-developed, well-hydrated, well-nourished, no acute distress.        HEENT           NECK supple, no masses or thyromegaly, no carotid bruit.        EYES           Extraocular Movements: normal, bilateral eyes AIYANA, no conjunctival injection.        HEART           Rate and Rhythm regular rate and rhythm. Heart sounds: normal S1S2, no S3 or S4. Murmurs: none.        CHEST           Breath sounds: Clear to IPPA, RR<16 no use of accessory muscles.        ABDOMEN           General: Neg for LKKS or masses, no scleral icterus or jaundice.        MUSCULOSKELETAL           Joints Demonstration: Neg for erythema, swelling or joint deformities. gross abnormalities no gross abnormalities.        EXTREMITIES           Lower Extremities: Neg for cyanosis, clubbing or edema.       Assessment/Plan     2024       To whom it may concern:  RE:  Klaus Grewal  1995    MED STABILIZATION LETTER provisionally shows he is stable today but has ONLY RECENTLY been on meds (now a combination of three BP meds including AMLODIPINE 10 mg daily, ATENOLOL 25 mg DAILY and SPIRONOLACTONE  12.5 mg daily) with  recent dosage titration so typically would need ongoing follow up at least every 6 months to be sure he tolerates the meds and remains stable and also lab follow up visits for use of the diuretic over time.  He SEEMS COMPLIANT with Rx plan and states that he is;  including taking meds and BP is normalized today.  Was sent to CARDIOLOGY for evaluation and he did attend so you may seek a note from  his treating Cardiologist to answer the questions which I have answered to the best of my ability as a Primary Care physician as above..    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          Sincerely,        Mary Ann Maynard M.D.  Problem List Items Addressed This Visit       Hypertension - Primary     Other Visit Diagnoses       Atypical chest pain        Left arm pain        Medication refill        Palpitations                FOLLOW UP:  PRN and as specified above         Mary Ann Maynard M.D.

## 2024-08-06 ENCOUNTER — OFFICE VISIT (OUTPATIENT)
Dept: PRIMARY CARE | Facility: CLINIC | Age: 29
End: 2024-08-06
Payer: COMMERCIAL

## 2024-08-06 VITALS
HEART RATE: 73 BPM | TEMPERATURE: 98.5 F | OXYGEN SATURATION: 99 % | SYSTOLIC BLOOD PRESSURE: 136 MMHG | DIASTOLIC BLOOD PRESSURE: 82 MMHG | BODY MASS INDEX: 34.31 KG/M2 | WEIGHT: 246 LBS

## 2024-08-06 DIAGNOSIS — M79.602 LEFT ARM PAIN: ICD-10-CM

## 2024-08-06 DIAGNOSIS — I10 HYPERTENSION, UNSPECIFIED TYPE: Primary | ICD-10-CM

## 2024-08-06 DIAGNOSIS — R00.2 PALPITATIONS: ICD-10-CM

## 2024-08-06 DIAGNOSIS — R07.89 ATYPICAL CHEST PAIN: ICD-10-CM

## 2024-08-06 DIAGNOSIS — Z76.0 MEDICATION REFILL: ICD-10-CM

## 2024-08-06 PROCEDURE — 3075F SYST BP GE 130 - 139MM HG: CPT | Performed by: FAMILY MEDICINE

## 2024-08-06 PROCEDURE — 1036F TOBACCO NON-USER: CPT | Performed by: FAMILY MEDICINE

## 2024-08-06 PROCEDURE — 3079F DIAST BP 80-89 MM HG: CPT | Performed by: FAMILY MEDICINE

## 2024-08-06 PROCEDURE — 99213 OFFICE O/P EST LOW 20 MIN: CPT | Performed by: FAMILY MEDICINE

## 2024-08-06 ASSESSMENT — PAIN SCALES - GENERAL: PAINLEVEL: 0-NO PAIN

## 2024-08-06 ASSESSMENT — COLUMBIA-SUICIDE SEVERITY RATING SCALE - C-SSRS
1. IN THE PAST MONTH, HAVE YOU WISHED YOU WERE DEAD OR WISHED YOU COULD GO TO SLEEP AND NOT WAKE UP?: NO
2. HAVE YOU ACTUALLY HAD ANY THOUGHTS OF KILLING YOURSELF?: NO
6. HAVE YOU EVER DONE ANYTHING, STARTED TO DO ANYTHING, OR PREPARED TO DO ANYTHING TO END YOUR LIFE?: NO

## 2024-08-06 ASSESSMENT — ENCOUNTER SYMPTOMS
LOSS OF SENSATION IN FEET: 0
OCCASIONAL FEELINGS OF UNSTEADINESS: 0
DEPRESSION: 0

## 2024-08-06 NOTE — LETTER
2024     Patient: Klaus Jackson   YOB: 1995   Date of Visit: 2024     To Whom It May Concern:    Klaus Jackson was seen in my clinic on 2024 at 1:15 pm.   Assessment/Plan  2024         To whom it may concern:  RE:  Klaus Grewal  1995     MED STABILIZATION LETTER provisionally shows he is stable today but has ONLY RECENTLY been on meds (now a combination of three BP meds including AMLODIPINE 10 mg daily, ATENOLOL 25 mg DAILY and SPIRONOLACTONE  12.5 mg daily) with  recent dosage titration so typically would need ongoing follow up at least every 6 months to be sure he tolerates the meds and remains stable and also lab follow up visits for use of the diuretic over time.  He SEEMS COMPLIANT with Rx plan and states that he is;  including taking meds and BP is normalized today.  Was sent to CARDIOLOGY for evaluation and he did attend so you may seek a note from his treating Cardiologist to answer the questions which I have answered to the best of my ability as a Primary Care physician as above..     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         Sincerely,           Mary Ann Maynard M.D.    If you have any questions or concerns, please don't hesitate to call.

## 2025-09-05 ENCOUNTER — APPOINTMENT (OUTPATIENT)
Dept: PRIMARY CARE | Facility: CLINIC | Age: 30
End: 2025-09-05
Payer: OTHER GOVERNMENT

## 2025-09-05 ASSESSMENT — ENCOUNTER SYMPTOMS
RESPIRATORY NEGATIVE: 1
ALLERGIC/IMMUNOLOGIC NEGATIVE: 1
NEUROLOGICAL NEGATIVE: 1
HEMATOLOGIC/LYMPHATIC NEGATIVE: 1
PHOTOPHOBIA: 0
CONSTITUTIONAL NEGATIVE: 1
EYE ITCHING: 0
EYE REDNESS: 0
GASTROINTESTINAL NEGATIVE: 1
PSYCHIATRIC NEGATIVE: 1
MUSCULOSKELETAL NEGATIVE: 1
CARDIOVASCULAR NEGATIVE: 1
LOSS OF SENSATION IN FEET: 0
ENDOCRINE NEGATIVE: 1
EYE PAIN: 0
EYE DISCHARGE: 0
OCCASIONAL FEELINGS OF UNSTEADINESS: 0
DEPRESSION: 0

## 2025-09-05 ASSESSMENT — COLUMBIA-SUICIDE SEVERITY RATING SCALE - C-SSRS
6. HAVE YOU EVER DONE ANYTHING, STARTED TO DO ANYTHING, OR PREPARED TO DO ANYTHING TO END YOUR LIFE?: NO
2. HAVE YOU ACTUALLY HAD ANY THOUGHTS OF KILLING YOURSELF?: NO
1. IN THE PAST MONTH, HAVE YOU WISHED YOU WERE DEAD OR WISHED YOU COULD GO TO SLEEP AND NOT WAKE UP?: NO

## 2025-09-05 ASSESSMENT — PATIENT HEALTH QUESTIONNAIRE - PHQ9
2. FEELING DOWN, DEPRESSED OR HOPELESS: NOT AT ALL
SUM OF ALL RESPONSES TO PHQ9 QUESTIONS 1 AND 2: 0
1. LITTLE INTEREST OR PLEASURE IN DOING THINGS: NOT AT ALL

## 2025-09-05 ASSESSMENT — PAIN SCALES - GENERAL: PAINLEVEL_OUTOF10: 0-NO PAIN

## 2025-09-09 ENCOUNTER — APPOINTMENT (OUTPATIENT)
Dept: PRIMARY CARE | Facility: CLINIC | Age: 30
End: 2025-09-09